# Patient Record
Sex: FEMALE | Race: WHITE | Employment: FULL TIME | URBAN - METROPOLITAN AREA
[De-identification: names, ages, dates, MRNs, and addresses within clinical notes are randomized per-mention and may not be internally consistent; named-entity substitution may affect disease eponyms.]

---

## 2017-11-27 ENCOUNTER — VIRTUAL VISIT (OUTPATIENT)
Dept: FAMILY MEDICINE | Facility: OTHER | Age: 32
End: 2017-11-27

## 2017-11-28 ENCOUNTER — TELEPHONE (OUTPATIENT)
Dept: URGENT CARE | Facility: URGENT CARE | Age: 32
End: 2017-11-28

## 2017-11-28 DIAGNOSIS — N10 ACUTE PYELONEPHRITIS: Primary | ICD-10-CM

## 2017-11-28 RX ORDER — ONDANSETRON 4 MG/1
4-8 TABLET, ORALLY DISINTEGRATING ORAL EVERY 8 HOURS PRN
Qty: 20 TABLET | Refills: 1 | Status: SHIPPED | OUTPATIENT
Start: 2017-11-28 | End: 2019-02-09

## 2017-11-28 RX ORDER — CIPROFLOXACIN 500 MG/1
500 TABLET, FILM COATED ORAL 2 TIMES DAILY
Qty: 20 TABLET | Refills: 0 | Status: SHIPPED | OUTPATIENT
Start: 2017-11-28 | End: 2019-02-09

## 2017-11-28 NOTE — PROGRESS NOTES
"Date:   Clinician: Pia Umana  Clinician NPI: [id number]  Patient: Susana Santoro  Patient : 1985  Patient Address: [address] [place] [id number]  Patient Phone: [phone]  Visit Protocol: UTI  Patient Summary:  Susana is a 31 year old ( : 1985 ) female who initiated a Visit for a presumed bladder infection. When asked the question \"Please sign me up to receive news, health information and promotions from [employer].\", Susana responded \"No\".    Her symptoms began yesterday and consist of dysuria, urinary frequency, foul smelling urine, and urgency.   Symptom Details   Urinary Frequency: Every hour    She denies abdominal pain, vaginal discharge, flank pain, vomiting, hematuria, urinary incontinence, loss of appetite, chills, recent antibiotic use, hesitation, feeling feverish, and nausea. Susana has never had kidney stones. She has not been hospitalized, been a patient in a nursing home, or had a catheter in the past two weeks. She denies risk factors for sexually transmitted infections.   Susana has not had any UTIs in the past 12 months. Her current symptoms are similar to the previous UTI symptoms. She took nitrofurantoin for her last infection and found it to be effective.   She has experienced side effects (upset stomach, vomiting, or diarrhea) from taking Bactrim DS (trimethoprim/sulfamethoxazole). Susana does not get yeast infections when she takes antibiotics.   She denies pregnancy and denies breastfeeding. She has menstruated in the past month.   She does NOT smoke or use smokeless tobacco.  MEDICATIONS:  No current medications , ALLERGIES:  NKDA   Clinician Response:  Dear Susana,  Based on the information you have provided, you likely have a bladder infection, also called an acute urinary tract infection (UTI).   To treat your infection, I am prescribing:   Nitrofurantoin (Macrobid). Swallow one (1) tablet twice a day for 5 days. Take the tablet with food. " Continue taking the tablets even if you feel better before all the medication is gone. There is no refill with this prescription.   Antibiotic selections by the provider are based on safety and effectiveness. You may or may not be prescribed the same medication that you took for your last bladder infection.   Some people develop allergies to antibiotics. If you notice a new rash, significant swelling, or difficulty breathing, stop the medication immediately and go into a clinic for physical evaluation.   To help treat your current UTI and prevent future occurrences, remember to:     Drink 8-10, 8-ounce glasses of water daily.    Urinate after sexual intercourse.    Wipe front to back after using the bathroom.     Some women may develop a yeast infection as a side effect of taking antibiotics. If you notice symptoms of a yeast infection, [facility] can help treat that condition as well. Simply log in and complete another Visit, which will cover all of the necessary questions to determine the best treatment for you.   You should visit a clinic for a follow-up visit if your symptoms do not improve in 1-2 days or if you experience another urinary tract infection soon after completing this treatment.  If you become pregnant during this course of treatment, stop taking the medication and contact your primary care provider.   Diagnosis: Acute Uncomplicated Bladder Infection  Diagnosis ICD: N39.0  Prescription: nitrofurantoin (Macrobid) 100mg oral tablet 10 tablets, 5 days supply. Take one tablet by mouth two times a day for 5 days. Refills: 0, Refill as needed: no, Allow substitutions: yes  Pharmacy: [id number] - [phone] - [phone] [facility], [place] [address]

## 2017-11-29 NOTE — TELEPHONE ENCOUNTER
Patient known to me, nurse practitioner.  Patient had uti symptoms for 2 days and started macrobid this afternoon  Within hours developed chills low grade fever nausea.  Suspect pyeloneprhitis  Recommend evaluation but will treat empirically  Prescribed with ciprofloxacin . Side effects discussed. Aware of the risks of increased tendon rupture with fluoroquinolones, especially if used with steroids.  zofran as needed nausea. Risk with ciprofloxacin of qt prolongation discussed as well.   Per patient, no chance of pregnancy, healthy no kidney or liver problems.  Come in asap if worsening symptoms  Recommend urinalysis prior to taking ciprofloxacin.  Deidre Barillas M.D.

## 2018-02-12 ENCOUNTER — TELEPHONE (OUTPATIENT)
Dept: URGENT CARE | Facility: URGENT CARE | Age: 33
End: 2018-02-12

## 2018-02-12 DIAGNOSIS — R06.2 WHEEZING: ICD-10-CM

## 2018-02-12 DIAGNOSIS — Z87.898 H/O MOTION SICKNESS: Primary | ICD-10-CM

## 2018-02-12 RX ORDER — ALBUTEROL SULFATE 90 UG/1
2 AEROSOL, METERED RESPIRATORY (INHALATION) EVERY 4 HOURS PRN
Qty: 1 INHALER | Refills: 0 | Status: SHIPPED | OUTPATIENT
Start: 2018-02-12

## 2018-02-12 RX ORDER — SCOLOPAMINE TRANSDERMAL SYSTEM 1 MG/1
PATCH, EXTENDED RELEASE TRANSDERMAL
Qty: 3 PATCH | Refills: 0 | Status: SHIPPED | OUTPATIENT
Start: 2018-02-12 | End: 2019-02-09

## 2018-05-28 ENCOUNTER — TELEPHONE (OUTPATIENT)
Dept: URGENT CARE | Facility: URGENT CARE | Age: 33
End: 2018-05-28

## 2018-05-28 DIAGNOSIS — H10.33 ACUTE CONJUNCTIVITIS OF BOTH EYES, UNSPECIFIED ACUTE CONJUNCTIVITIS TYPE: Primary | ICD-10-CM

## 2018-05-28 RX ORDER — OFLOXACIN 3 MG/ML
SOLUTION/ DROPS OPHTHALMIC
Qty: 1 BOTTLE | Refills: 0 | Status: SHIPPED | OUTPATIENT
Start: 2018-05-28 | End: 2019-02-09

## 2018-05-28 NOTE — TELEPHONE ENCOUNTER
Patient complained of pink eye symptoms.  Requested eye drops.  No alarm signs or symptoms. Not a contact lens wearer.  Prescribed with ofloxacin eye drops.  Follow up as needed.

## 2018-09-26 ENCOUNTER — TELEPHONE (OUTPATIENT)
Dept: FAMILY MEDICINE | Facility: CLINIC | Age: 33
End: 2018-09-26

## 2018-09-26 DIAGNOSIS — N39.0 URINARY TRACT INFECTION WITHOUT HEMATURIA, SITE UNSPECIFIED: Primary | ICD-10-CM

## 2018-09-26 RX ORDER — CIPROFLOXACIN 500 MG/1
500 TABLET, FILM COATED ORAL 2 TIMES DAILY
Qty: 14 TABLET | Refills: 0 | Status: SHIPPED | OUTPATIENT
Start: 2018-09-26 | End: 2019-02-09

## 2018-09-26 NOTE — TELEPHONE ENCOUNTER
Patient called. Patient known to me.  Had lower UTI symptoms that led to pyelonephritis last year.  Coming in with similar symptoms  Started with a day or two of lower UTI symptoms but last night had felt feverish with chills.  Patient requesting for ciprofloxacin to be re-ordered. She was recommended to be seen but she refuses at this time  She is ok with doing a urine and urine culture to confirm senstivities and will be going in if symptoms do not improve or if worsen  Alarm signs or symptoms discussed, if present recommend go to ER   Prescribed with ciprofloxacin . Side effects discussed. Aware of the risks of increased tendon rupture with fluoroquinolones, especially if used with steroids.  Deidre Barillas M.D.

## 2019-02-09 ENCOUNTER — ANCILLARY PROCEDURE (OUTPATIENT)
Dept: GENERAL RADIOLOGY | Facility: CLINIC | Age: 34
End: 2019-02-09
Attending: FAMILY MEDICINE
Payer: COMMERCIAL

## 2019-02-09 ENCOUNTER — OFFICE VISIT (OUTPATIENT)
Dept: URGENT CARE | Facility: URGENT CARE | Age: 34
End: 2019-02-09
Payer: COMMERCIAL

## 2019-02-09 VITALS
HEART RATE: 72 BPM | TEMPERATURE: 98.1 F | DIASTOLIC BLOOD PRESSURE: 75 MMHG | SYSTOLIC BLOOD PRESSURE: 134 MMHG | OXYGEN SATURATION: 99 % | RESPIRATION RATE: 16 BRPM

## 2019-02-09 DIAGNOSIS — M54.6 ACUTE RIGHT-SIDED THORACIC BACK PAIN: ICD-10-CM

## 2019-02-09 DIAGNOSIS — R50.9 FEBRILE ILLNESS: Primary | ICD-10-CM

## 2019-02-09 LAB
ALBUMIN UR-MCNC: NEGATIVE MG/DL
APPEARANCE UR: CLEAR
BACTERIA #/AREA URNS HPF: ABNORMAL /HPF
BASOPHILS # BLD AUTO: 0 10E9/L (ref 0–0.2)
BASOPHILS NFR BLD AUTO: 0.4 %
BILIRUB UR QL STRIP: NEGATIVE
COLOR UR AUTO: YELLOW
DEPRECATED S PYO AG THROAT QL EIA: NORMAL
DIFFERENTIAL METHOD BLD: NORMAL
EOSINOPHIL # BLD AUTO: 0.1 10E9/L (ref 0–0.7)
EOSINOPHIL NFR BLD AUTO: 0.9 %
ERYTHROCYTE [DISTWIDTH] IN BLOOD BY AUTOMATED COUNT: 12.8 % (ref 10–15)
FLUAV+FLUBV AG SPEC QL: NEGATIVE
FLUAV+FLUBV AG SPEC QL: NEGATIVE
GLUCOSE UR STRIP-MCNC: NEGATIVE MG/DL
HCT VFR BLD AUTO: 40.6 % (ref 35–47)
HGB BLD-MCNC: 13.5 G/DL (ref 11.7–15.7)
HGB UR QL STRIP: NEGATIVE
KETONES UR STRIP-MCNC: ABNORMAL MG/DL
LEUKOCYTE ESTERASE UR QL STRIP: NEGATIVE
LYMPHOCYTES # BLD AUTO: 3.3 10E9/L (ref 0.8–5.3)
LYMPHOCYTES NFR BLD AUTO: 41.9 %
MCH RBC QN AUTO: 30.5 PG (ref 26.5–33)
MCHC RBC AUTO-ENTMCNC: 33.3 G/DL (ref 31.5–36.5)
MCV RBC AUTO: 92 FL (ref 78–100)
MONOCYTES # BLD AUTO: 1 10E9/L (ref 0–1.3)
MONOCYTES NFR BLD AUTO: 12.3 %
NEUTROPHILS # BLD AUTO: 3.5 10E9/L (ref 1.6–8.3)
NEUTROPHILS NFR BLD AUTO: 44.5 %
NITRATE UR QL: NEGATIVE
NON-SQ EPI CELLS #/AREA URNS LPF: ABNORMAL /LPF
PH UR STRIP: 6 PH (ref 5–7)
PLATELET # BLD AUTO: 239 10E9/L (ref 150–450)
RBC # BLD AUTO: 4.43 10E12/L (ref 3.8–5.2)
RBC #/AREA URNS AUTO: ABNORMAL /HPF
SOURCE: ABNORMAL
SP GR UR STRIP: 1.02 (ref 1–1.03)
SPECIMEN SOURCE: NORMAL
SPECIMEN SOURCE: NORMAL
UROBILINOGEN UR STRIP-ACNC: 0.2 EU/DL (ref 0.2–1)
WBC # BLD AUTO: 7.8 10E9/L (ref 4–11)
WBC #/AREA URNS AUTO: ABNORMAL /HPF

## 2019-02-09 PROCEDURE — 36415 COLL VENOUS BLD VENIPUNCTURE: CPT | Performed by: FAMILY MEDICINE

## 2019-02-09 PROCEDURE — 99204 OFFICE O/P NEW MOD 45 MIN: CPT | Performed by: FAMILY MEDICINE

## 2019-02-09 PROCEDURE — 72070 X-RAY EXAM THORAC SPINE 2VWS: CPT

## 2019-02-09 PROCEDURE — 81001 URINALYSIS AUTO W/SCOPE: CPT | Performed by: FAMILY MEDICINE

## 2019-02-09 PROCEDURE — 87081 CULTURE SCREEN ONLY: CPT | Performed by: FAMILY MEDICINE

## 2019-02-09 PROCEDURE — 85025 COMPLETE CBC W/AUTO DIFF WBC: CPT | Performed by: FAMILY MEDICINE

## 2019-02-09 PROCEDURE — 87880 STREP A ASSAY W/OPTIC: CPT | Performed by: FAMILY MEDICINE

## 2019-02-09 PROCEDURE — 87804 INFLUENZA ASSAY W/OPTIC: CPT | Performed by: FAMILY MEDICINE

## 2019-02-09 RX ORDER — CYCLOBENZAPRINE HCL 5 MG
TABLET ORAL
Qty: 60 TABLET | Refills: 0 | Status: SHIPPED | OUTPATIENT
Start: 2019-02-09

## 2019-02-09 RX ORDER — HYDROCODONE BITARTRATE AND ACETAMINOPHEN 5; 325 MG/1; MG/1
1 TABLET ORAL EVERY 6 HOURS PRN
Qty: 12 TABLET | Refills: 0 | Status: SHIPPED | OUTPATIENT
Start: 2019-02-09

## 2019-02-09 NOTE — NURSING NOTE
"Chief Complaint   Patient presents with     Derm Problem     rash on left pain on her right        Initial /75   Pulse 72   Temp 98.1  F (36.7  C) (Oral)   Resp 16   SpO2 99%   Breastfeeding? No  Estimated body mass index is 20.89 kg/m  as calculated from the following:    Height as of 2/6/14: 1.695 m (5' 6.75\").    Weight as of 2/6/14: 60.1 kg (132 lb 6.4 oz).  Medication Reconciliation: complete  Daisy Rosa MA    "

## 2019-02-09 NOTE — PROGRESS NOTES
Chief complaint: rash and back pain    No known medical problems  Denies any possibility of pregnancy declined a pregnancy test    Has had a cough runny nose for couple of weeks   Thought was getting better     Noticed an irritation rash in the left upper arm 3 days ago   Was more blisterlike initially     But then she woke up this morning excruciating right upper back pain  No loss of control of bowel or bladder, no numbness or weakness down the legs or arms   Notice it all the time  Seems to be slightly worse with movement - not sure if muscular  Patient has tried ibuprofen and heat   Notice it more with breathing     Also had a fever today. Fever of 100    No urinary symptoms    Concern for shingles    PERC Rule     Age <50 years   Heart rate <100 beats/minute   Oxyhemoglobin saturation ?95 percent   No hemoptysis   No estrogen use   No prior DVT or PE   No unilateral leg swelling   No surgery/trauma requiring hospitalization within the prior four weeks    No family history of blood clots.     No abdominal pain  No nausea vomiting    Denies any history of ulcers or kidney disease or any known contraindication to NSAIDs      SOCIAL HISTORY: nonsmoker  No alcohol use or drug use     Allergies   Allergen Reactions     Sulfa Drugs        Past Medical History:   Diagnosis Date     Synovitis and tenosynovitis, unspecified 2/6/2014     Current Outpatient Medications   Medication     albuterol (PROAIR HFA/PROVENTIL HFA/VENTOLIN HFA) 108 (90 BASE) MCG/ACT Inhaler     cyclobenzaprine (FLEXERIL) 5 MG tablet     HYDROcodone-acetaminophen (NORCO) 5-325 MG tablet     Multiple Vitamin (DAILY MULTIVITAMIN PO)     NO ACTIVE MEDICATIONS     Omega-3 Fatty Acids (FISH OIL PO)     No current facility-administered medications for this visit.          Social History     Tobacco Use     Smoking status: Never Smoker     Smokeless tobacco: Never Used   Substance Use Topics     Alcohol use: No     Drug use: No       ROS:  review of systems  negative except for noted above.   No thoughts of harming self or others. Feels safe at home. No trauma     OBJECTIVE:  /75   Pulse 72   Temp 98.1  F (36.7  C) (Oral)   Resp 16   SpO2 99%   Breastfeeding? No    General:   awake, alert, and cooperative.  NAD.   Head: Normocephalic, atraumatic.  Eyes: Conjunctiva clear,   ENT: midline nasal septum no congestion. Bilateral TYMPANINC MEMBRANE normal   Mouth: moist buccal mucosa nonhyperemic posterior pharyngeal wall tonsils not enlarged  Neck: supple no cervical lymphadenopathy  Heart: Regular rate and rhythm. No murmur.  Lungs: Chest is clear; no wheezes or rales.   Abdomen: soft non-tender.  Neuro: Alert and oriented - normal speech.  MS: Using extremities freely  Moderate mid thoracic spine tenderness associated with right mid thoraci paraspinal muscle spasm, No abnormal straight leg raise testing MMT5/5, sensory intact, normal reflexes  PSYCH:  Normal affect, normal speech  SKIN: left upper arm small erythematous plaque with slight scale about 10x5mm not tender slightly raw and itchy   And a couple of pinpoint papules along the axillary area  No other rash noted   Pain however is in the right upper back     Diagnostic Test Results:  Results for orders placed or performed in visit on 02/09/19 (from the past 24 hour(s))   CBC with platelets differential   Result Value Ref Range    WBC 7.8 4.0 - 11.0 10e9/L    RBC Count 4.43 3.8 - 5.2 10e12/L    Hemoglobin 13.5 11.7 - 15.7 g/dL    Hematocrit 40.6 35.0 - 47.0 %    MCV 92 78 - 100 fl    MCH 30.5 26.5 - 33.0 pg    MCHC 33.3 31.5 - 36.5 g/dL    RDW 12.8 10.0 - 15.0 %    Platelet Count 239 150 - 450 10e9/L    % Neutrophils 44.5 %    % Lymphocytes 41.9 %    % Monocytes 12.3 %    % Eosinophils 0.9 %    % Basophils 0.4 %    Absolute Neutrophil 3.5 1.6 - 8.3 10e9/L    Absolute Lymphocytes 3.3 0.8 - 5.3 10e9/L    Absolute Monocytes 1.0 0.0 - 1.3 10e9/L    Absolute Eosinophils 0.1 0.0 - 0.7 10e9/L    Absolute Basophils  0.0 0.0 - 0.2 10e9/L    Diff Method Automated Method    Influenza A/B antigen   Result Value Ref Range    Influenza A/B Agn Specimen Nasal     Influenza A Negative NEG^Negative    Influenza B Negative NEG^Negative   Rapid strep screen   Result Value Ref Range    Specimen Description Throat     Rapid Strep A Screen       NEGATIVE: No Group A streptococcal antigen detected by immunoassay, await culture report.   UA with Microscopic reflex to Culture   Result Value Ref Range    Color Urine Yellow     Appearance Urine Clear     Glucose Urine Negative NEG^Negative mg/dL    Bilirubin Urine Negative NEG^Negative    Ketones Urine Trace (A) NEG^Negative mg/dL    Specific Gravity Urine 1.025 1.003 - 1.035    pH Urine 6.0 5.0 - 7.0 pH    Protein Albumin Urine Negative NEG^Negative mg/dL    Urobilinogen Urine 0.2 0.2 - 1.0 EU/dL    Nitrite Urine Negative NEG^Negative    Blood Urine Negative NEG^Negative    Leukocyte Esterase Urine Negative NEG^Negative    Source Midstream Urine     WBC Urine 0 - 5 OTO5^0 - 5 /HPF    RBC Urine O - 2 OTO2^O - 2 /HPF    Squamous Epithelial /LPF Urine Few FEW^Few /LPF    Bacteria Urine Few (A) NEG^Negative /HPF   XR Thoracic Spine 2 Views    Narrative    THORACIC SPINE TWO VIEWS  2/9/2019 4:49 PM     HISTORY: Right-sided thoracic pain. Fever.    COMPARISON: None.      Impression    IMPRESSION: No evidence for acute fracture or gross malalignment in  the visualized portion of the thoracic spine. The upper portion of the  thoracic spine is not well-visualized on the lateral view.    ARAM ABRAMS MD         ASSESSMENT:    ICD-10-CM    1. Febrile illness R50.9 CBC with platelets differential     UA with Microscopic reflex to Culture     Influenza A/B antigen     Rapid strep screen     XR Thoracic Spine 3 Views     XR Thoracic Spine 2 Views     Beta strep group A culture   2. Acute right-sided thoracic back pain M54.6 CBC with platelets differential     UA with Microscopic reflex to Culture      Influenza A/B antigen     Rapid strep screen     XR Thoracic Spine 3 Views     XR Thoracic Spine 2 Views     HYDROcodone-acetaminophen (NORCO) 5-325 MG tablet     cyclobenzaprine (FLEXERIL) 5 MG tablet       PLAN:   On exam appears musculoskeletal which is reassuring  Low risk for PE. Perc score zero. Patient declines d-dimer testing  Cbc also within normal limites. So less worrisome for discitis  However she did have report of a low grade temp today but patient had recent respiratory symptoms ? Viral . Recommend monitoring  If fevers spike higher associated with this back pain - may recommend ER for stat MRI to rule out discitis especially with tender spine.  Rash does not appear consistent with shingles - may be some mild contact dermatitis. Trial over the counter hydrocortisone.   However if right upper back does start to have rash recommend re-evaluation to consider multiple nerve involvement shingles  Urinalysis strep and flu negative. Possible viral syndrome  Alarm signs or symptoms discussed, if present recommend go to ER     Patient requested something for pain. She has tried over the counter without much relief  Limited supply vicodin. Aware sedating and habit forming   reviewed no concerns   Do not take with other sedating meds or substances   Sedating medications given. Aware not to drive or operate machinery while on these medications. Caution with .   May also try flexeril  Also warned about possible sedation    Recommend follow up and consider MRI given the atypical nature - and lack of clear history of trauma or overuse - if symptoms persist    Deidre Barillas M.D.           Advised about symptoms which might herald more serious problems.        Deidre Barillas MD

## 2019-02-10 LAB
BACTERIA SPEC CULT: NORMAL
SPECIMEN SOURCE: NORMAL

## 2019-08-27 ENCOUNTER — TELEPHONE (OUTPATIENT)
Dept: FAMILY MEDICINE | Facility: CLINIC | Age: 34
End: 2019-08-27

## 2019-08-27 DIAGNOSIS — H10.33 ACUTE CONJUNCTIVITIS OF BOTH EYES, UNSPECIFIED ACUTE CONJUNCTIVITIS TYPE: Primary | ICD-10-CM

## 2019-08-27 RX ORDER — OFLOXACIN 3 MG/ML
1-2 SOLUTION/ DROPS OPHTHALMIC 4 TIMES DAILY
Qty: 5 ML | Refills: 0 | Status: SHIPPED | OUTPATIENT
Start: 2019-08-27 | End: 2019-09-03

## 2019-08-27 NOTE — TELEPHONE ENCOUNTER
Patient called with conjunctivitis type symptoms  No red flags  No blurring of vision no photophobia no eye pain no feeling of foreign body no history of eye trauma,  Prescribed with ofloxacin  Side effects discussed  Alarm signs or symptoms discussed  Follow up if persist  Deidre Barillas M.D.

## 2020-06-05 ENCOUNTER — TELEPHONE (OUTPATIENT)
Dept: FAMILY MEDICINE | Facility: CLINIC | Age: 35
End: 2020-06-05

## 2020-06-05 DIAGNOSIS — Z20.822 EXPOSURE TO COVID-19 VIRUS: Primary | ICD-10-CM

## 2020-06-06 NOTE — TELEPHONE ENCOUNTER
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  Concern for exposure.   Deidre Barillas M.D.

## 2020-09-01 ENCOUNTER — TELEPHONE (OUTPATIENT)
Dept: FAMILY MEDICINE | Facility: CLINIC | Age: 35
End: 2020-09-01

## 2020-09-01 DIAGNOSIS — H10.31 ACUTE CONJUNCTIVITIS OF RIGHT EYE, UNSPECIFIED ACUTE CONJUNCTIVITIS TYPE: Primary | ICD-10-CM

## 2020-09-01 RX ORDER — OFLOXACIN 3 MG/ML
1-2 SOLUTION/ DROPS OPHTHALMIC 4 TIMES DAILY
Qty: 1 BOTTLE | Refills: 0 | Status: SHIPPED | OUTPATIENT
Start: 2020-09-01

## 2020-09-02 NOTE — TELEPHONE ENCOUNTER
Patient called with right eye conjunctivitis symptoms  No reported reg flags  Not pregnant  Does not wear contact lenses.  Follow up with eye clinic if no improvement  Deidre Barillas M.D.

## 2021-08-25 ENCOUNTER — TELEPHONE (OUTPATIENT)
Dept: FAMILY MEDICINE | Facility: CLINIC | Age: 36
End: 2021-08-25

## 2021-08-25 DIAGNOSIS — Z78.9 IMMUNE TO VARICELLA: Primary | ICD-10-CM

## 2022-08-18 ENCOUNTER — TELEPHONE (OUTPATIENT)
Dept: URGENT CARE | Facility: URGENT CARE | Age: 37
End: 2022-08-18

## 2022-08-18 DIAGNOSIS — N30.00 ACUTE CYSTITIS WITHOUT HEMATURIA: Primary | ICD-10-CM

## 2022-08-18 RX ORDER — NITROFURANTOIN 25; 75 MG/1; MG/1
100 CAPSULE ORAL 2 TIMES DAILY
Qty: 14 CAPSULE | Refills: 0 | Status: SHIPPED | OUTPATIENT
Start: 2022-08-18 | End: 2022-08-25

## 2022-08-18 NOTE — TELEPHONE ENCOUNTER
Patient reports dysuria urgency frequency  Denies red flag symptoms   Requesting macrobid  Follow up if symptoms persist  Alarm signs or symptoms discussed, if present recommend go to JERE Barillas M.D.

## 2023-03-03 ENCOUNTER — TRANSFERRED RECORDS (OUTPATIENT)
Dept: HEALTH INFORMATION MANAGEMENT | Facility: CLINIC | Age: 38
End: 2023-03-03

## 2023-06-14 ENCOUNTER — APPOINTMENT (OUTPATIENT)
Dept: LAB | Facility: CLINIC | Age: 38
End: 2023-06-14

## 2023-06-16 DIAGNOSIS — S69.92XA INJURY OF LEFT HAND, INITIAL ENCOUNTER: Primary | ICD-10-CM

## 2023-06-17 ENCOUNTER — ANCILLARY PROCEDURE (OUTPATIENT)
Dept: GENERAL RADIOLOGY | Facility: CLINIC | Age: 38
End: 2023-06-17
Attending: PHYSICIAN ASSISTANT

## 2023-06-17 DIAGNOSIS — S69.92XA INJURY OF LEFT HAND, INITIAL ENCOUNTER: ICD-10-CM

## 2023-06-17 PROCEDURE — 73130 X-RAY EXAM OF HAND: CPT | Mod: TC | Performed by: RADIOLOGY
